# Patient Record
Sex: FEMALE | Race: WHITE | Employment: OTHER | URBAN - METROPOLITAN AREA
[De-identification: names, ages, dates, MRNs, and addresses within clinical notes are randomized per-mention and may not be internally consistent; named-entity substitution may affect disease eponyms.]

---

## 2021-07-22 ENCOUNTER — HOSPITAL ENCOUNTER (EMERGENCY)
Age: 41
Discharge: HOME | End: 2021-07-23
Attending: EMERGENCY MEDICINE
Payer: COMMERCIAL

## 2021-07-22 DIAGNOSIS — K76.9 LESION OF LIVER GREATER THAN 1 CM IN DIAMETER: ICD-10-CM
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
## 2021-07-22 DIAGNOSIS — R40.20 LOSS OF CONSCIOUSNESS (HCC): ICD-10-CM

## 2021-07-22 DIAGNOSIS — S05.11XA PERIORBITAL CONTUSION OF RIGHT EYE, INITIAL ENCOUNTER: ICD-10-CM

## 2021-07-22 DIAGNOSIS — S00.03XA HEMATOMA OF SCALP, INITIAL ENCOUNTER: ICD-10-CM

## 2021-07-22 DIAGNOSIS — S01.81XA FACIAL LACERATION, INITIAL ENCOUNTER: ICD-10-CM

## 2021-07-22 DIAGNOSIS — F10.920 ACUTE ALCOHOLIC INTOXICATION WITHOUT COMPLICATION (HCC): ICD-10-CM

## 2021-07-22 PROCEDURE — 99283 EMERGENCY DEPT VISIT LOW MDM: CPT

## 2021-07-22 PROCEDURE — 12014 RPR F/E/E/N/L/M 5.1-7.5 CM: CPT

## 2021-07-23 ENCOUNTER — APPOINTMENT (OUTPATIENT)
Dept: CT IMAGING | Age: 41
End: 2021-07-23
Payer: COMMERCIAL

## 2021-07-23 ENCOUNTER — APPOINTMENT (OUTPATIENT)
Dept: GENERAL RADIOLOGY | Age: 41
End: 2021-07-23
Payer: COMMERCIAL

## 2021-07-23 VITALS
SYSTOLIC BLOOD PRESSURE: 174 MMHG | RESPIRATION RATE: 17 BRPM | DIASTOLIC BLOOD PRESSURE: 118 MMHG | HEART RATE: 97 BPM | OXYGEN SATURATION: 97 %

## 2021-07-23 LAB
-: NORMAL
ABO/RH: NORMAL
ALLEN TEST: ABNORMAL
AMORPHOUS: NORMAL
AMPHETAMINE SCREEN URINE: NEGATIVE
ANION GAP SERPL CALCULATED.3IONS-SCNC: 16 MMOL/L (ref 9–17)
ANTIBODY SCREEN: NEGATIVE
ARM BAND NUMBER: NORMAL
BACTERIA: NORMAL
BARBITURATE SCREEN URINE: NEGATIVE
BENZODIAZEPINE SCREEN, URINE: NEGATIVE
BILIRUBIN URINE: NEGATIVE
BLOOD BANK SPECIMEN: ABNORMAL
BUN BLDV-MCNC: 12 MG/DL (ref 6–20)
BUPRENORPHINE URINE: NORMAL
CANNABINOID SCREEN URINE: NEGATIVE
CARBOXYHEMOGLOBIN: 0.6 % (ref 0–5)
CASTS UA: NORMAL /LPF (ref 0–8)
CHLORIDE BLD-SCNC: 108 MMOL/L (ref 98–107)
CO2: 15 MMOL/L (ref 20–31)
COCAINE METABOLITE, URINE: NEGATIVE
COLOR: YELLOW
COMMENT UA: ABNORMAL
CREAT SERPL-MCNC: 0.53 MG/DL (ref 0.5–0.9)
CRYSTALS, UA: NORMAL /HPF
EPITHELIAL CELLS UA: NORMAL /HPF (ref 0–5)
ETHANOL PERCENT: 0.21 %
ETHANOL: 208 MG/DL
EXPIRATION DATE: NORMAL
FIO2: ABNORMAL
GFR AFRICAN AMERICAN: ABNORMAL ML/MIN
GFR NON-AFRICAN AMERICAN: ABNORMAL ML/MIN
GFR SERPL CREATININE-BSD FRML MDRD: ABNORMAL ML/MIN/{1.73_M2}
GFR SERPL CREATININE-BSD FRML MDRD: ABNORMAL ML/MIN/{1.73_M2}
GLUCOSE BLD-MCNC: 141 MG/DL (ref 70–99)
GLUCOSE URINE: NEGATIVE
HCG QUALITATIVE: NEGATIVE
HCO3 VENOUS: 18.4 MMOL/L (ref 24–30)
HCT VFR BLD CALC: 43.2 % (ref 36.3–47.1)
HEMOGLOBIN: 13.3 G/DL (ref 11.9–15.1)
INR BLD: 0.9
KETONES, URINE: ABNORMAL
LEUKOCYTE ESTERASE, URINE: NEGATIVE
MCH RBC QN AUTO: 28.5 PG (ref 25.2–33.5)
MCHC RBC AUTO-ENTMCNC: 30.8 G/DL (ref 28.4–34.8)
MCV RBC AUTO: 92.5 FL (ref 82.6–102.9)
MDMA URINE: NORMAL
METHADONE SCREEN, URINE: NEGATIVE
METHAMPHETAMINE, URINE: NORMAL
METHEMOGLOBIN: ABNORMAL % (ref 0–1.5)
MODE: ABNORMAL
MUCUS: NORMAL
NEGATIVE BASE EXCESS, VEN: 6.4 MMOL/L (ref 0–2)
NITRITE, URINE: NEGATIVE
NOTIFICATION TIME: ABNORMAL
NOTIFICATION: ABNORMAL
NRBC AUTOMATED: 0 PER 100 WBC
O2 DEVICE/FLOW/%: ABNORMAL
O2 SAT, VEN: 85.1 % (ref 60–85)
OPIATES, URINE: NEGATIVE
OTHER OBSERVATIONS UA: NORMAL
OXYCODONE SCREEN URINE: NEGATIVE
OXYHEMOGLOBIN: ABNORMAL % (ref 95–98)
PARTIAL THROMBOPLASTIN TIME: 25.7 SEC (ref 20.5–30.5)
PATIENT TEMP: 37
PCO2, VEN, TEMP ADJ: ABNORMAL MMHG (ref 39–55)
PCO2, VEN: 36.2 (ref 39–55)
PDW BLD-RTO: 13.5 % (ref 11.8–14.4)
PEEP/CPAP: ABNORMAL
PH UA: 5 (ref 5–8)
PH VENOUS: 7.33 (ref 7.32–7.42)
PH, VEN, TEMP ADJ: ABNORMAL (ref 7.32–7.42)
PHENCYCLIDINE, URINE: NEGATIVE
PLATELET # BLD: 356 K/UL (ref 138–453)
PMV BLD AUTO: 10.3 FL (ref 8.1–13.5)
PO2, VEN, TEMP ADJ: ABNORMAL MMHG (ref 30–50)
PO2, VEN: 57.8 (ref 30–50)
POSITIVE BASE EXCESS, VEN: ABNORMAL MMOL/L (ref 0–2)
POTASSIUM SERPL-SCNC: 3.9 MMOL/L (ref 3.7–5.3)
PROPOXYPHENE, URINE: NORMAL
PROTEIN UA: NEGATIVE
PROTHROMBIN TIME: 9.7 SEC (ref 9.1–12.3)
PSV: ABNORMAL
PT. POSITION: ABNORMAL
RBC # BLD: 4.67 M/UL (ref 3.95–5.11)
RBC UA: NORMAL /HPF (ref 0–4)
RENAL EPITHELIAL, UA: NORMAL /HPF
RESPIRATORY RATE: ABNORMAL
SAMPLE SITE: ABNORMAL
SET RATE: ABNORMAL
SODIUM BLD-SCNC: 139 MMOL/L (ref 135–144)
SPECIFIC GRAVITY UA: 1.02 (ref 1–1.03)
TEST INFORMATION: NORMAL
TEXT FOR RESPIRATORY: ABNORMAL
TOTAL HB: ABNORMAL G/DL (ref 12–16)
TOTAL RATE: ABNORMAL
TRICHOMONAS: NORMAL
TRICYCLIC ANTIDEPRESSANTS, UR: NORMAL
TURBIDITY: CLEAR
URINE HGB: ABNORMAL
UROBILINOGEN, URINE: NORMAL
VT: ABNORMAL
WBC # BLD: 9.7 K/UL (ref 3.5–11.3)
WBC UA: NORMAL /HPF (ref 0–5)
YEAST: NORMAL

## 2021-07-23 PROCEDURE — 6370000000 HC RX 637 (ALT 250 FOR IP): Performed by: GENERAL PRACTICE

## 2021-07-23 PROCEDURE — 81001 URINALYSIS AUTO W/SCOPE: CPT

## 2021-07-23 PROCEDURE — 85730 THROMBOPLASTIN TIME PARTIAL: CPT

## 2021-07-23 PROCEDURE — 86850 RBC ANTIBODY SCREEN: CPT

## 2021-07-23 PROCEDURE — 72125 CT NECK SPINE W/O DYE: CPT

## 2021-07-23 PROCEDURE — 82565 ASSAY OF CREATININE: CPT

## 2021-07-23 PROCEDURE — 84703 CHORIONIC GONADOTROPIN ASSAY: CPT

## 2021-07-23 PROCEDURE — 73130 X-RAY EXAM OF HAND: CPT

## 2021-07-23 PROCEDURE — G0480 DRUG TEST DEF 1-7 CLASSES: HCPCS

## 2021-07-23 PROCEDURE — 80307 DRUG TEST PRSMV CHEM ANLYZR: CPT

## 2021-07-23 PROCEDURE — 71260 CT THORAX DX C+: CPT

## 2021-07-23 PROCEDURE — 86900 BLOOD TYPING SEROLOGIC ABO: CPT

## 2021-07-23 PROCEDURE — 70486 CT MAXILLOFACIAL W/O DYE: CPT

## 2021-07-23 PROCEDURE — 87635 SARS-COV-2 COVID-19 AMP PRB: CPT

## 2021-07-23 PROCEDURE — 80051 ELECTROLYTE PANEL: CPT

## 2021-07-23 PROCEDURE — 86901 BLOOD TYPING SEROLOGIC RH(D): CPT

## 2021-07-23 PROCEDURE — 6360000004 HC RX CONTRAST MEDICATION: Performed by: STUDENT IN AN ORGANIZED HEALTH CARE EDUCATION/TRAINING PROGRAM

## 2021-07-23 PROCEDURE — 3209999900 CT LUMBAR SPINE TRAUMA RECONSTRUCTION

## 2021-07-23 PROCEDURE — 36415 COLL VENOUS BLD VENIPUNCTURE: CPT

## 2021-07-23 PROCEDURE — 84520 ASSAY OF UREA NITROGEN: CPT

## 2021-07-23 PROCEDURE — 3209999900 CT THORACIC SPINE TRAUMA RECONSTRUCTION

## 2021-07-23 PROCEDURE — 82805 BLOOD GASES W/O2 SATURATION: CPT

## 2021-07-23 PROCEDURE — 70450 CT HEAD/BRAIN W/O DYE: CPT

## 2021-07-23 PROCEDURE — 82947 ASSAY GLUCOSE BLOOD QUANT: CPT

## 2021-07-23 PROCEDURE — 85027 COMPLETE CBC AUTOMATED: CPT

## 2021-07-23 PROCEDURE — 85610 PROTHROMBIN TIME: CPT

## 2021-07-23 RX ORDER — HYDROCODONE BITARTRATE AND ACETAMINOPHEN 5; 325 MG/1; MG/1
2 TABLET ORAL ONCE
Status: COMPLETED | OUTPATIENT
Start: 2021-07-23 | End: 2021-07-23

## 2021-07-23 RX ORDER — LIDOCAINE HYDROCHLORIDE AND EPINEPHRINE 10; 10 MG/ML; UG/ML
20 INJECTION, SOLUTION INFILTRATION; PERINEURAL ONCE
Status: DISCONTINUED | OUTPATIENT
Start: 2021-07-23 | End: 2021-07-23 | Stop reason: HOSPADM

## 2021-07-23 RX ORDER — CARVEDILOL 12.5 MG/1
25 TABLET ORAL ONCE
Status: COMPLETED | OUTPATIENT
Start: 2021-07-23 | End: 2021-07-23

## 2021-07-23 RX ORDER — FENTANYL CITRATE 50 UG/ML
50 INJECTION, SOLUTION INTRAMUSCULAR; INTRAVENOUS ONCE
Status: DISCONTINUED | OUTPATIENT
Start: 2021-07-23 | End: 2021-07-23 | Stop reason: HOSPADM

## 2021-07-23 RX ORDER — ONDANSETRON 4 MG/1
4 TABLET, ORALLY DISINTEGRATING ORAL EVERY 8 HOURS PRN
Qty: 20 TABLET | Refills: 0 | Status: SHIPPED | OUTPATIENT
Start: 2021-07-23

## 2021-07-23 RX ORDER — ONDANSETRON 4 MG/1
4 TABLET, ORALLY DISINTEGRATING ORAL ONCE
Status: COMPLETED | OUTPATIENT
Start: 2021-07-23 | End: 2021-07-23

## 2021-07-23 RX ORDER — ONDANSETRON 2 MG/ML
4 INJECTION INTRAMUSCULAR; INTRAVENOUS ONCE
Status: DISCONTINUED | OUTPATIENT
Start: 2021-07-23 | End: 2021-07-23 | Stop reason: HOSPADM

## 2021-07-23 RX ADMIN — CARVEDILOL 25 MG: 12.5 TABLET, FILM COATED ORAL at 07:50

## 2021-07-23 RX ADMIN — IOPAMIDOL 130 ML: 755 INJECTION, SOLUTION INTRAVENOUS at 01:16

## 2021-07-23 RX ADMIN — HYDROCODONE BITARTRATE AND ACETAMINOPHEN 2 TABLET: 5; 325 TABLET ORAL at 07:50

## 2021-07-23 RX ADMIN — ONDANSETRON 4 MG: 4 TABLET, ORALLY DISINTEGRATING ORAL at 08:57

## 2021-07-23 ASSESSMENT — ENCOUNTER SYMPTOMS
ABDOMINAL PAIN: 0
SORE THROAT: 0
PHOTOPHOBIA: 0
ROS SKIN COMMENTS: ECCHYMOSIS.
VOMITING: 0
SHORTNESS OF BREATH: 0
NAUSEA: 0
COUGH: 0

## 2021-07-23 ASSESSMENT — PAIN SCALES - GENERAL: PAINLEVEL_OUTOF10: 7
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
## 2021-07-23 NOTE — ED NOTES
Bed: 25  Expected date:   Expected time:   Means of arrival:   Comments:  TRAuma A     Javon Ambriz RN  07/23/21 0505

## 2021-07-23 NOTE — FLOWSHEET NOTE
Crisis   Type Trauma  (Priority)   Assessment Approachable;Tearful;Fearful   Intervention Active listening;Explored feelings, thoughts, concerns;Explored coping resources; Discussed illness/injury and it's impact   Outcome Expressed gratitude;Expressed feelings/needs/concerns;Engaged in conversation; Tearful;Less anxious, less agitated     Electronically signed by Delfino Sal on 7/23/2021 at 3:34 AM

## 2021-07-23 NOTE — ED NOTES
IV 20ga LAC removed       Dry dressing applied to wound on forehead      Maria Alejandra Fay, RENUKA  07/23/21 3817

## 2021-07-23 NOTE — ED PROVIDER NOTES
[address] HANDOFF       Handoff taken on the following patient from prior Attending Physician:  Pt Name: Wesley Vick  PCP:  No primary care provider on file. Attestation  I was available and discussed any additional care issues that arose and coordinated the management plans with the resident(s) caring for the patient during my duty period. Any areas of disagreement with resident's documentation of care or procedures are noted on the chart. I was personally present for the key portions of any/all procedures during my duty period. I have documented in the chart those procedures where I was not present during the key portions. CHIEF COMPLAINT       Chief Complaint   Patient presents with    Fall    Laceration         CURRENT MEDICATIONS     Previous Medications  Previous Medications    No medications on file       Encounter Medications  Orders Placed This Encounter   Medications    iopamidol (ISOVUE-370) 76 % injection 130 mL    fentaNYL (SUBLIMAZE) injection 50 mcg    ondansetron (ZOFRAN) injection 4 mg    lidocaine-EPINEPHrine 1 %-1:[id number] injection 20 mL    fentaNYL (SUBLIMAZE) injection 50 mcg       ALLERGIES     has no allergies on file. RECENT VITALS:    ,  Pulse: 97, Resp: 17, BP: (!) 174/118    RADIOLOGY:   XR HAND RIGHT (MIN 3 VIEWS)   Final Result   Unremarkable radiographic views of the right hand. CT CHEST ABDOMEN PELVIS W CONTRAST   Final Result   1. No evidence of acute traumatic injury in the chest, abdomen or pelvis. 2. No acute traumatic injury to the thoracic or lumbar spine. 3. Indeterminate 3 cm low-attenuation hepatic lesion. Recommend correlation   with outpatient liver protocol MRI (preferred) or CT to better characterize. CT THORACIC SPINE TRAUMA RECONSTRUCTION   Final Result   1. No evidence of acute traumatic injury in the chest, abdomen or pelvis. 2. No acute traumatic injury to the thoracic or lumbar spine. 3. Indeterminate 3 cm low-attenuation hepatic lesion. Recommend correlation   with outpatient liver protocol MRI (preferred) or CT to better characterize. CT LUMBAR SPINE TRAUMA RECONSTRUCTION   Final Result   1. No evidence of acute traumatic injury in the chest, abdomen or pelvis. 2. No acute traumatic injury to the thoracic or lumbar spine. 3. Indeterminate 3 cm low-attenuation hepatic lesion. Recommend correlation   with outpatient liver protocol MRI (preferred) or CT to better characterize. CT HEAD WO CONTRAST   Final Result   No acute intracranial abnormality. Right frontal scalp and periorbital   contusion. No acute fracture in the cervical spine and facial bones. CT CERVICAL SPINE WO CONTRAST   Final Result   No acute intracranial abnormality. Right frontal scalp and periorbital   contusion. No acute fracture in the cervical spine and facial bones. CT FACIAL BONES WO CONTRAST   Final Result   No acute intracranial abnormality. Right frontal scalp and periorbital   contusion. No acute fracture in the cervical spine and facial bones.              LABS:  Labs Reviewed   URINALYSIS - Abnormal; Notable for the following components:       Result Value    Ketones, Urine SMALL (*)     Urine Hgb SMALL (*)     All other components within normal limits   TRAUMA PANEL - Abnormal; Notable for the following components:    Ethanol 208 (*)     Ethanol percent 0.208 (*)     Glucose 141 (*)     Chloride 108 (*)     CO2 15 (*)     pCO2, Oliver 36.2 (*)     pO2, Oliver 57.8 (*)     HCO3, Venous 18.4 (*)     Negative Base Excess, Oliver 6.4 (*)     O2 Sat, Oliver 85.1 (*)     All other components within normal limits   COVID-19, RAPID   URINE DRUG SCREEN   MICROSCOPIC URINALYSIS   TYPE AND SCREEN     Fall, laceration, imaging, anticipate discharge after wound care    PLAN/ TASKS OUTSTANDING       Wound repair with sutures by trauma team, imaging, disposition    (Please note that portions of this note were completed with a voice recognition program.  Efforts were made to edit the dictations but occasionally words are mis-transcribed. )    Filemon Robles MD,, MD, F.A.C.E.P.   Attending Emergency Physician       Filemon Robles MD  07/23/21 0996
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
## 2021-07-23 NOTE — PROGRESS NOTES
CLINICAL PHARMACY NOTE: MEDS TO BEDS    Total # of Prescriptions Filled: 1   The following medications were delivered to the patient:  · Ondansetron 4 mg ODT tabs    Additional Documentation:Medication delivered to the patient (ER 25). Meet patient in the hallway  By ER  when she was on her way to the pharmacy and delivered to her. Paid on clover.

## 2021-07-23 NOTE — ED NOTES
Attempted pt d/c. Pt is feeling dizzy and wanted to rest a few moments before leaving.       Martine Keller RN  07/23/21 1946
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
## 2021-07-23 NOTE — PROCEDURES
PROCEDURE NOTE - LACERATION CLOSURE    PATIENT NAME: [id number] [address] RECORD NO. [id number]  DATE: 7/23/2021  SURGEON: Danae Lipscomb MD   PRIMARY CARE PHYSICIAN: No primary care provider on file. PREOPERATIVE DIAGNOSIS: Laceration(s) as follows:   LOCATION: right forehead, right inferior nare   LENGTH:  2, 2cm    LAYERED CLOSURE: No     POSTOPERATIVE DIAGNOSIS:  Same  PROCEDURE PERFORMED:  Suture closure of laceration  SURGEON: Danae Lipscomb MD, Leonid Thomas MD   ANESTHESIA:  Local utilizing  Lidocaine 1% with epinephrine  ESTIMATED BLOOD LOSS:  Less than 5 ml. COMPLICATIONS:  None immediately appreciated. OPERATIVE NOTE PREPARED BY: Madeleine Yañez MD     DISCUSSION:  Wesley Vick is a 38y.o.-year-old female. The history and physical examination were reviewed and confirmed. The diagnoses, proposed procedure, risks, possible complications, benefits and alternatives were discussed with the patient or family. She was given the opportunity to ask questions, and once answered, informed consent was obtained. The patient was then prepared for the procedure. PROCEDURE:  A timeout was initiated and the procedure and patient were confirmed by those present. The wound area was irrigated with sterile saline and draped in a sterile fashion. The wound area was anesthetized with Lidocaine 1% with epinephrine without added sodium bicarbonate. The wound was explored with the following results No foreign bodies found. 5-0 prolene was used in an interrupted fashion on the medial forehead 2cm laceration. 5-0 prolene was used in an interrupted fashion on the right inferior nare. No immediate complication was evident. The patient tolerated the procedure well. All sponge, instrument and needle counts were correct at the completion of the procedure. Electronically signed by Danae Lipscomb MD on 7/23/2021 at 8:13 AM    Secured, tolerated well.

## 2021-07-23 NOTE — ED PROVIDER NOTES
[facility] [address] [facility]  Emergency Department  Emergency Medicine Resident Sign-out     [facility] of [facility] was assumed from Dr. Nicky Quevedo and is being seen for Fall and Laceration  . The patient's initial evaluation and plan have been discussed with the prior provider who initially evaluated the patient. EMERGENCY DEPARTMENT COURSE / MEDICAL DECISION MAKING:       MEDICATIONS GIVEN:  Orders Placed This Encounter   Medications    iopamidol (ISOVUE-370) 76 % injection 130 mL    fentaNYL (SUBLIMAZE) injection 50 mcg    ondansetron (ZOFRAN) injection 4 mg       LABS / RADIOLOGY:     Labs Reviewed   URINALYSIS - Abnormal; Notable for the following components:       Result Value    Ketones, Urine SMALL (*)     Urine Hgb SMALL (*)     All other components within normal limits   TRAUMA PANEL - Abnormal; Notable for the following components:    Ethanol 208 (*)     Ethanol percent 0.208 (*)     Glucose 141 (*)     Chloride 108 (*)     CO2 15 (*)     pCO2, Oliver 36.2 (*)     pO2, Oliver 57.8 (*)     HCO3, Venous 18.4 (*)     Negative Base Excess, Oliver 6.4 (*)     O2 Sat, Oliver 85.1 (*)     All other components within normal limits   COVID-19, RAPID   URINE DRUG SCREEN   MICROSCOPIC URINALYSIS   TYPE AND SCREEN       CT HEAD WO CONTRAST    Result Date: 7/23/2021  EXAMINATION: CT OF THE HEAD WITHOUT CONTRAST; CT OF THE CERVICAL SPINE WITHOUT CONTRAST; CT OF THE FACE WITHOUT CONTRAST  7/23/2021 12:54 am TECHNIQUE: CT of the head was performed without the administration of intravenous contrast. Dose modulation, iterative reconstruction, and/or weight based adjustment of the mA/kV was utilized to reduce the radiation dose to as low as reasonably achievable.; CT of the cervical spine was performed without the administration of intravenous contrast. Multiplanar reformatted images are provided for review.  Dose modulation, iterative reconstruction, and/or weight based adjustment of the mA/kV was utilized to reduce the radiation dose to as low as reasonably achievable.; CT of the face was performed without the administration of intravenous contrast. Multiplanar reformatted images are provided for review. Dose modulation, iterative reconstruction, and/or weight based adjustment of the mA/kV was utilized to reduce the radiation dose to as low as reasonably achievable. COMPARISON: None. HISTORY: ORDERING SYSTEM PROVIDED HISTORY: trauma TECHNOLOGIST PROVIDED HISTORY: FINDINGS: CT HEAD: BRAIN/VENTRICLES: There is no acute intracranial hemorrhage, mass effect or midline shift. No abnormal extra-axial fluid collection. The gray-white differentiation is maintained without evidence of an acute infarct. There is no evidence of hydrocephalus. CT FACIAL BONES: FACIAL BONES: The maxilla, pterygoid plates and zygomatic arches are intact. The mandible is intact. The mandibular condyles are normally situated. The nasal bones and maxillary nasal processes are intact. ORBITS: The globes appear intact. The extraocular muscles, optic nerve sheath complexes and lacrimal glands appear unremarkable. No retrobulbar hematoma or mass is seen. The orbital walls and rims are intact. SINUSES/MASTOIDS: The paranasal sinuses and mastoid air cells are well aerated. No acute fracture is seen. SOFT TISSUES/SKULL: No acute abnormality of the visualized skull. Right frontal scalp and periorbital contusion. CT CERVICAL SPINE: BONES/ALIGNMENT: There is no acute fracture or traumatic malalignment. DEGENERATIVE CHANGES: Mild degenerative changes. SOFT TISSUES: There is no prevertebral soft tissue swelling. No acute intracranial abnormality. Right frontal scalp and periorbital contusion. No acute fracture in the cervical spine and facial bones.      CT FACIAL BONES WO CONTRAST    Result Date: 7/23/2021  EXAMINATION: CT OF THE HEAD WITHOUT CONTRAST; CT OF THE CERVICAL SPINE WITHOUT CONTRAST; CT OF THE FACE WITHOUT CONTRAST  7/23/2021 12:54 am TECHNIQUE: CT of the head was performed without the administration of intravenous contrast. Dose modulation, iterative reconstruction, and/or weight based adjustment of the mA/kV was utilized to reduce the radiation dose to as low as reasonably achievable.; CT of the cervical spine was performed without the administration of intravenous contrast. Multiplanar reformatted images are provided for review. Dose modulation, iterative reconstruction, and/or weight based adjustment of the mA/kV was utilized to reduce the radiation dose to as low as reasonably achievable.; CT of the face was performed without the administration of intravenous contrast. Multiplanar reformatted images are provided for review. Dose modulation, iterative reconstruction, and/or weight based adjustment of the mA/kV was utilized to reduce the radiation dose to as low as reasonably achievable. COMPARISON: None. HISTORY: ORDERING SYSTEM PROVIDED HISTORY: trauma TECHNOLOGIST PROVIDED HISTORY: FINDINGS: CT HEAD: BRAIN/VENTRICLES: There is no acute intracranial hemorrhage, mass effect or midline shift. No abnormal extra-axial fluid collection. The gray-white differentiation is maintained without evidence of an acute infarct. There is no evidence of hydrocephalus. CT FACIAL BONES: FACIAL BONES: The maxilla, pterygoid plates and zygomatic arches are intact. The mandible is intact. The mandibular condyles are normally situated. The nasal bones and maxillary nasal processes are intact. ORBITS: The globes appear intact. The extraocular muscles, optic nerve sheath complexes and lacrimal glands appear unremarkable. No retrobulbar hematoma or mass is seen. The orbital walls and rims are intact. SINUSES/MASTOIDS: The paranasal sinuses and mastoid air cells are well aerated. No acute fracture is seen. SOFT TISSUES/SKULL: No acute abnormality of the visualized skull. Right frontal scalp and periorbital contusion.  CT CERVICAL SPINE: BONES/ALIGNMENT: There is no acute fracture or traumatic malalignment. DEGENERATIVE CHANGES: Mild degenerative changes. SOFT TISSUES: There is no prevertebral soft tissue swelling. No acute intracranial abnormality. Right frontal scalp and periorbital contusion. No acute fracture in the cervical spine and facial bones. CT CERVICAL SPINE WO CONTRAST    Result Date: 7/23/2021  EXAMINATION: CT OF THE HEAD WITHOUT CONTRAST; CT OF THE CERVICAL SPINE WITHOUT CONTRAST; CT OF THE FACE WITHOUT CONTRAST  7/23/2021 12:54 am TECHNIQUE: CT of the head was performed without the administration of intravenous contrast. Dose modulation, iterative reconstruction, and/or weight based adjustment of the mA/kV was utilized to reduce the radiation dose to as low as reasonably achievable.; CT of the cervical spine was performed without the administration of intravenous contrast. Multiplanar reformatted images are provided for review. Dose modulation, iterative reconstruction, and/or weight based adjustment of the mA/kV was utilized to reduce the radiation dose to as low as reasonably achievable.; CT of the face was performed without the administration of intravenous contrast. Multiplanar reformatted images are provided for review. Dose modulation, iterative reconstruction, and/or weight based adjustment of the mA/kV was utilized to reduce the radiation dose to as low as reasonably achievable. COMPARISON: None. HISTORY: ORDERING SYSTEM PROVIDED HISTORY: trauma TECHNOLOGIST PROVIDED HISTORY: FINDINGS: CT HEAD: BRAIN/VENTRICLES: There is no acute intracranial hemorrhage, mass effect or midline shift. No abnormal extra-axial fluid collection. The gray-white differentiation is maintained without evidence of an acute infarct. There is no evidence of hydrocephalus. CT FACIAL BONES: FACIAL BONES: The maxilla, pterygoid plates and zygomatic arches are intact. The mandible is intact. The mandibular condyles are normally situated.   The nasal bones and maxillary nasal dose to as low as reasonably achievable. COMPARISON: None HISTORY: ORDERING SYSTEM PROVIDED HISTORY: trauma TECHNOLOGIST PROVIDED HISTORY: trauma Decision Support Exception - unselect if not a suspected or confirmed emergency medical condition->Emergency Medical Condition (MA) Reason for Exam: trauma- pt fell off golf cart Acuity: Unknown Type of Exam: Unknown FINDINGS: Chest: Mediastinum:  No evidence of mediastinal hematoma or pneumomediastinum. No acute traumatic injury to the heart or pericardium. Lungs/pleura:  No acute traumatic injury to the lungs. No evidence of pulmonary contusion or laceration. No evidence of effusion or pneumothorax. Soft Tissues/Bones:  No acute displaced rib fracture or chest wall hematoma. No evidence of sternal fracture. Abdomen/Pelvis: Organs: No evidence of acute traumatic injury to the solid organs. The liver, spleen, pancreas, kidneys and adrenal glands are without acute findings. The gallbladder is present without radiopaque cholelithiasis. There is an indeterminate 3 cm low-attenuation lesion in hepatic segment 7. GI/Bowel: Small hiatal hernia. No evidence of acute traumatic injury to the bowel. No mechanical bowel obstruction. Normal appendix. No pericolonic stranding. Pelvis: Urinary bladder is distended. No evidence of acute traumatic injury to the bladder. No pelvic hematoma. Premenopausal appearance of the uterus and ovaries. The uterus is retroflexed. Peritoneum/Retroperitoneum: No pneumoperitoneum or hemoperitoneum. No mesenteric hematoma. Bones/Soft Tissues: No pelvic fracture. Thoracic and lumbar spine: There is no evidence of acute traumatic injury to the thoracic or lumbar spine. Vertebral body heights and AP alignment are maintained. 1. No evidence of acute traumatic injury in the chest, abdomen or pelvis. 2. No acute traumatic injury to the thoracic or lumbar spine. 3. Indeterminate 3 cm low-attenuation hepatic lesion.   Recommend correlation with outpatient liver protocol MRI (preferred) or CT to better characterize. CT LUMBAR SPINE TRAUMA RECONSTRUCTION    Result Date: 7/23/2021  EXAMINATION: CT OF THE CHEST, ABDOMEN, AND PELVIS WITH CONTRAST; CT OF THE THORACIC SPINE WITHOUT CONTRAST; CT OF THE LUMBAR SPINE WITHOUT CONTRAST 7/23/2021 12:54 am TECHNIQUE: CT of the chest, abdomen and pelvis was performed with the administration of intravenous contrast. Multiplanar reformatted images are provided for review. Dose modulation, iterative reconstruction, and/or weight based adjustment of the mA/kV was utilized to reduce the radiation dose to as low as reasonably achievable.; CT of the thoracic spine was performed without the administration of intravenous contrast. Multiplanar reformatted images are provided for review. Dose modulation, iterative reconstruction, and/or weight based adjustment of the mA/kV was utilized to reduce the radiation dose to as low as reasonably achievable.; CT of the lumbar spine was performed without the administration of intravenous contrast. Multiplanar reformatted images are provided for review. Dose modulation, iterative reconstruction, and/or weight based adjustment of the mA/kV was utilized to reduce the radiation dose to as low as reasonably achievable. COMPARISON: None HISTORY: ORDERING SYSTEM PROVIDED HISTORY: trauma TECHNOLOGIST PROVIDED HISTORY: trauma Decision Support Exception - unselect if not a suspected or confirmed emergency medical condition->Emergency Medical Condition (MA) Reason for Exam: trauma- pt fell off golf cart Acuity: Unknown Type of Exam: Unknown FINDINGS: Chest: Mediastinum:  No evidence of mediastinal hematoma or pneumomediastinum. No acute traumatic injury to the heart or pericardium. Lungs/pleura:  No acute traumatic injury to the lungs. No evidence of pulmonary contusion or laceration. No evidence of effusion or pneumothorax.  Soft Tissues/Bones:  No acute displaced rib fracture or chest wall hematoma. No evidence of sternal fracture. Abdomen/Pelvis: Organs: No evidence of acute traumatic injury to the solid organs. The liver, spleen, pancreas, kidneys and adrenal glands are without acute findings. The gallbladder is present without radiopaque cholelithiasis. There is an indeterminate 3 cm low-attenuation lesion in hepatic segment 7. GI/Bowel: Small hiatal hernia. No evidence of acute traumatic injury to the bowel. No mechanical bowel obstruction. Normal appendix. No pericolonic stranding. Pelvis: Urinary bladder is distended. No evidence of acute traumatic injury to the bladder. No pelvic hematoma. Premenopausal appearance of the uterus and ovaries. The uterus is retroflexed. Peritoneum/Retroperitoneum: No pneumoperitoneum or hemoperitoneum. No mesenteric hematoma. Bones/Soft Tissues: No pelvic fracture. Thoracic and lumbar spine: There is no evidence of acute traumatic injury to the thoracic or lumbar spine. Vertebral body heights and AP alignment are maintained. 1. No evidence of acute traumatic injury in the chest, abdomen or pelvis. 2. No acute traumatic injury to the thoracic or lumbar spine. 3. Indeterminate 3 cm low-attenuation hepatic lesion. Recommend correlation with outpatient liver protocol MRI (preferred) or CT to better characterize. CT THORACIC SPINE TRAUMA RECONSTRUCTION    Result Date: 7/23/2021  EXAMINATION: CT OF THE CHEST, ABDOMEN, AND PELVIS WITH CONTRAST; CT OF THE THORACIC SPINE WITHOUT CONTRAST; CT OF THE LUMBAR SPINE WITHOUT CONTRAST 7/23/2021 12:54 am TECHNIQUE: CT of the chest, abdomen and pelvis was performed with the administration of intravenous contrast. Multiplanar reformatted images are provided for review.  Dose modulation, iterative reconstruction, and/or weight based adjustment of the mA/kV was utilized to reduce the radiation dose to as low as reasonably achievable.; CT of the thoracic spine was performed without the administration of intravenous contrast. Multiplanar reformatted images are provided for review. Dose modulation, iterative reconstruction, and/or weight based adjustment of the mA/kV was utilized to reduce the radiation dose to as low as reasonably achievable.; CT of the lumbar spine was performed without the administration of intravenous contrast. Multiplanar reformatted images are provided for review. Dose modulation, iterative reconstruction, and/or weight based adjustment of the mA/kV was utilized to reduce the radiation dose to as low as reasonably achievable. COMPARISON: None HISTORY: ORDERING SYSTEM PROVIDED HISTORY: trauma TECHNOLOGIST PROVIDED HISTORY: trauma Decision Support Exception - unselect if not a suspected or confirmed emergency medical condition->Emergency Medical Condition (MA) Reason for Exam: trauma- pt fell off golf cart Acuity: Unknown Type of Exam: Unknown FINDINGS: Chest: Mediastinum:  No evidence of mediastinal hematoma or pneumomediastinum. No acute traumatic injury to the heart or pericardium. Lungs/pleura:  No acute traumatic injury to the lungs. No evidence of pulmonary contusion or laceration. No evidence of effusion or pneumothorax. Soft Tissues/Bones:  No acute displaced rib fracture or chest wall hematoma. No evidence of sternal fracture. Abdomen/Pelvis: Organs: No evidence of acute traumatic injury to the solid organs. The liver, spleen, pancreas, kidneys and adrenal glands are without acute findings. The gallbladder is present without radiopaque cholelithiasis. There is an indeterminate 3 cm low-attenuation lesion in hepatic segment 7. GI/Bowel: Small hiatal hernia. No evidence of acute traumatic injury to the bowel. No mechanical bowel obstruction. Normal appendix. No pericolonic stranding. Pelvis: Urinary bladder is distended. No evidence of acute traumatic injury to the bladder. No pelvic hematoma. Premenopausal appearance of the uterus and ovaries. The uterus is retroflexed.